# Patient Record
Sex: MALE | Race: WHITE | NOT HISPANIC OR LATINO | Employment: OTHER | URBAN - METROPOLITAN AREA
[De-identification: names, ages, dates, MRNs, and addresses within clinical notes are randomized per-mention and may not be internally consistent; named-entity substitution may affect disease eponyms.]

---

## 2023-01-02 ENCOUNTER — HOSPITAL ENCOUNTER (EMERGENCY)
Facility: CLINIC | Age: 33
Discharge: HOME | End: 2023-01-02
Attending: EMERGENCY MEDICINE | Admitting: EMERGENCY MEDICINE
Payer: COMMERCIAL

## 2023-01-02 ENCOUNTER — APPOINTMENT (OUTPATIENT)
Dept: CT IMAGING | Facility: CLINIC | Age: 33
End: 2023-01-02
Attending: EMERGENCY MEDICINE
Payer: COMMERCIAL

## 2023-01-02 VITALS
OXYGEN SATURATION: 99 % | SYSTOLIC BLOOD PRESSURE: 135 MMHG | DIASTOLIC BLOOD PRESSURE: 87 MMHG | TEMPERATURE: 98.4 F | RESPIRATION RATE: 18 BRPM | HEART RATE: 60 BPM

## 2023-01-02 DIAGNOSIS — S09.90XA INJURY OF HEAD, INITIAL ENCOUNTER: ICD-10-CM

## 2023-01-02 PROCEDURE — 70450 CT HEAD/BRAIN W/O DYE: CPT

## 2023-01-02 PROCEDURE — 99284 EMERGENCY DEPT VISIT MOD MDM: CPT | Mod: 25

## 2023-01-02 ASSESSMENT — ENCOUNTER SYMPTOMS
BACK PAIN: 0
HEADACHES: 1
NECK PAIN: 0
ABDOMINAL PAIN: 0
LIGHT-HEADEDNESS: 1

## 2023-01-02 NOTE — ED TRIAGE NOTES
Hit head Saturday while snowboarding, now having mild headache.Takes eliquis, last took on December 28th. 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

## 2023-01-02 NOTE — ED PROVIDER NOTES
History     Chief Complaint:  Head Injury       HPI   The patient states that 5 days ago he stopped using his Eliquis (for factor5) because he was going snowboarding. While he was snowboarding two days ago he fell and hit his head on the snow. He denies any lose of consciousness but states he has been feeling gross since the incident with a slight headache and light headed ness. He denies any other injury including back pain, neck pain, chest pain, or abdominal pain. He wants to make sure he does not have a brain bleed before resuming his prescribed medication.      Independent Historian: Yes    Review of External Notes: No     ROS:  Review of Systems   Cardiovascular: Negative for chest pain.   Gastrointestinal: Negative for abdominal pain.   Musculoskeletal: Negative for back pain and neck pain.   Neurological: Positive for light-headedness and headaches.   All other systems reviewed and are negative.      Allergies:  No Known Allergies     Medications:    The patient is currently on no regular medications.    Past Medical History:    Lyme disease  Arthropathy of lower leg  SHELBIE  DVT  IBS  Factor 5 mutation    Social History:  The patient presents to the ED alone.    Physical Exam     Patient Vitals for the past 24 hrs:   BP Temp Temp src Pulse Resp SpO2   01/02/23 1645 135/87 98.4  F (36.9  C) Temporal 60 18 99 %        Physical Exam  Constitutional: Well appearing.  HEENT: Bruising to right cheek inferior to right eye without tenderness or deformity. Sclera normal bilaterally.  PERRL.  EOMI.  Moist mucous membranes.  Neck: Soft.  Supple.  No JVD.  Cardiac: Regular rate and rhythm.  No murmur or rub.  Respiratory: Clear to auscultation bilaterally.  No respiratory distress.  No wheezing, rhonchi, or rales.  Musculoskeletal: No edema.  Normal range of motion.  Neurologic: Alert and oriented x3.  Normal tone and bulk. No facial drooping. Normal speech.  5/5 strength in bilateral upper and lower extremities.   Sensation to light touch intact throughout.  Normal gait.  Skin: No rashes.  No edema.  Psych: Normal affect.  Normal behavior.      Emergency Department Course     Imaging:  CT Head w/o Contrast   Final Result   IMPRESSION:   1.  Normal head CT.         Report per radiology    Emergency Department Course & Assessments:         Disposition:  The patient was discharged to home.     Impression & Plan      Medical Decision Making:  Juan Shah is a 32-year-old man who is afebrile and hemodynamically stable.  He has no external signs of trauma.  He is neurologically intact.  CT scan of the head was undertaken after discussion and he is in agreement with proceeding.  Thankfully, the CT scan revealed no acute intracranial abnormalities.  He does have some bruising and swelling of the right facial area inferior to the eye with no clinical signs of entrapment and no significant tenderness that would necessitate imaging of this area.  We discussed resuming his anticoagulation and having close primary care follow-up and he is in agreement.  Head injury precautions were given.  His questions were answered and he was in no distress at time of discharge.    Critical Care time:  was 0 minutes for this patient excluding procedures.    Diagnosis:    ICD-10-CM    1. Injury of head, initial encounter  S09.90XA          Scribe Disclosure:  Alpesh UPTON, am serving as a scribe at 4:49 PM on 1/2/2023 to document services personally performed by Rik Castillo MD based on my observations and the provider's statements to me.     1/2/2023   Rik Castillo MD Salay, Nicholas J, MD  01/03/23 1059

## 2023-05-21 ENCOUNTER — HEALTH MAINTENANCE LETTER (OUTPATIENT)
Age: 33
End: 2023-05-21

## 2023-08-13 ENCOUNTER — HEALTH MAINTENANCE LETTER (OUTPATIENT)
Age: 33
End: 2023-08-13

## 2024-10-06 ENCOUNTER — HEALTH MAINTENANCE LETTER (OUTPATIENT)
Age: 34
End: 2024-10-06